# Patient Record
Sex: FEMALE | Race: AMERICAN INDIAN OR ALASKA NATIVE | NOT HISPANIC OR LATINO | URBAN - METROPOLITAN AREA
[De-identification: names, ages, dates, MRNs, and addresses within clinical notes are randomized per-mention and may not be internally consistent; named-entity substitution may affect disease eponyms.]

---

## 2021-06-21 ENCOUNTER — EMERGENCY (EMERGENCY)
Facility: HOSPITAL | Age: 26
LOS: 1 days | Discharge: HOME | End: 2021-06-21
Attending: EMERGENCY MEDICINE | Admitting: EMERGENCY MEDICINE
Payer: COMMERCIAL

## 2021-06-21 VITALS
HEIGHT: 64 IN | OXYGEN SATURATION: 99 % | WEIGHT: 149.91 LBS | RESPIRATION RATE: 16 BRPM | SYSTOLIC BLOOD PRESSURE: 107 MMHG | HEART RATE: 78 BPM | TEMPERATURE: 98 F | DIASTOLIC BLOOD PRESSURE: 75 MMHG
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
## 2021-06-21 DIAGNOSIS — T15.92XA FOREIGN BODY ON EXTERNAL EYE, PART UNSPECIFIED, LEFT EYE, INITIAL ENCOUNTER: ICD-10-CM
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
## 2021-06-21 PROCEDURE — 99283 EMERGENCY DEPT VISIT LOW MDM: CPT

## 2021-06-21 RX ORDER — MOXIFLOXACIN HCL 0.5 %
1 DROPS OPHTHALMIC (EYE) ONCE
Refills: 0 | Status: COMPLETED | OUTPATIENT
Start: 2021-06-21 | End: 2021-06-21

## 2021-06-21 RX ORDER — MOXIFLOXACIN HCL 0.5 %
1 DROPS OPHTHALMIC (EYE) ONCE
Refills: 0 | Status: DISCONTINUED | OUTPATIENT
Start: 2021-06-21 | End: 2021-06-21

## 2021-06-21 RX ADMIN — Medication 1 DROP(S): at 22:35

## 2021-06-21 RX ADMIN — Medication 1 DROP(S): at 20:52

## 2021-06-21 NOTE — ED PROVIDER NOTE - CLINICAL SUMMARY MEDICAL DECISION MAKING FREE TEXT BOX
under flourescein stain, no corneal abrasion or FB appreciated, scanned upper and lower eyelids with slit lamp, no ulcer appreciated. cornea otherwise normal. will refer to optho in am, started on abx eye drops, strict return precautions.

## 2021-06-21 NOTE — ED PROVIDER NOTE - CARE PROVIDER_API CALL
Stevie Radford  OPHTHALMOLOGY  [address] [address]  Phone: [phone]  Fax: [phone]  Follow Up Time:

## 2021-06-21 NOTE — ED PROVIDER NOTE - OBJECTIVE STATEMENT
27 yo F, no pmhx, presents with L eye fb sensation. notes she was attempting to readjust her L daily wear contact lens, and has been unable to remove it from the eye. has sensation that contact lens is stuck to L upper inner eyelid. denies worsening blurry vision, denies pain, denies discharge or tearing. denies facial numbness, paresthesias or swelling. notes no hx of eye surgery. wears daily contacts.

## 2021-06-21 NOTE — ED PROVIDER NOTE - PATIENT PORTAL LINK FT
You can access the FollowMyHealth Patient Portal offered by [facility] by registering at the following website: [email or web address]. By joining [facility] FollowMyHealth portal, you will also be able to view your health information using other applications (apps) compatible with our system.

## 2021-06-21 NOTE — ED ADULT NURSE NOTE - NSIMPLEMENTINTERV_GEN_ALL_ED
Implemented All Universal Safety Interventions:  [place] to call system. Call bell, personal items and telephone within reach. Instruct patient to call for assistance. Room bathroom lighting operational. Non-slip footwear when patient is off stretcher. Physically safe environment: no spills, clutter or unnecessary equipment. Stretcher in lowest position, wheels locked, appropriate side rails in place.

## 2021-06-21 NOTE — ED PROVIDER NOTE - NSFOLLOWUPINSTRUCTIONS_ED_ALL_ED_FT
What Type Of Note Output Would You Prefer (Optional)?: Standard Output
How Severe Is Your Skin Lesion?: mild
Has Your Skin Lesion Been Treated?: not been treated
Is This A New Presentation, Or A Follow-Up?: Skin Lesions
Eye Foreign Body    WHAT YOU NEED TO KNOW:    What is an eye foreign body (EFB)? An EFB is an object that gets stuck in your eye. Tiny pieces of metal, dust, wood, and sand are the most common foreign bodies.    Eye Anatomy         What are the signs and symptoms of an EFB?   •The feeling that something is in your eye      •Eye pain, redness, or watering      •Sensitivity to light      •Blurry vision or changes in your vision      How is an EFB diagnosed? Your healthcare provider will ask about your symptoms and examine your eye. The provider may check your vision by asking you to read letters or numbers off of a chart. You may need any of the following:  •A slit-lamp test uses a microscope to look into your eye and check for injury. A dye may be used to look for scratches or other damage to your eye.             •Ultrasound or CT pictures may show where the foreign body is located in your eye. It may also show damage to deeper parts of your eye. You may be given contrast liquid to help your eye show up better in pictures. Tell the healthcare provider if you have ever had an allergic reaction to contrast liquid.      How is an EFB treated? Medicines will be given to decrease pain or prevent an infection. Your healthcare provider may numb your eye and flush it with liquid to help remove the FB. The provider may also use a cotton swab or other tools to help remove the FB. If the FB is hard to remove or has damaged deeper parts of your eye, you will need surgery to remove it.    What can I do to help my eye heal? You may have pain, sensitivity to light, or blurry vision for a few days. Do the following to help your eye heal:  •Do not rub your eye. This may cause more damage or infection.      •Do not wear your contacts lenses until your eye heals. Ask your healthcare provider how long to follow this direction.      •Wear sunglasses as directed. Sunglasses help protect the eye and decrease sensitivity to light.      What can I do to prevent another EFB?   •Wear safety glasses, eye shields, or goggles. These items can prevent eye injury. Make sure the eyewear wraps around the sides of your face. Wear these items while you work with chemicals, metal, wood, or bodily fluids such as blood. Also wear protective eyewear during sports such as racquetball or swimming. Do not use regular eye glasses for eye protection. They will not protect your eyes from foreign bodies or chemicals.      •Use contact lenses as directed. Wash your hands before you clean, insert, or remove your contacts. Insert and remove contact lenses correctly. Clean and change your contacts as directed to help prevent eye damage or infection.  Handwashing           When should I seek immediate care?   •You suddenly lose your vision.      •You have severe eye pain.      When should I call my doctor?   •You have new or worse eye swelling.      •Your symptoms do not get better, even after the foreign body is removed.      •You have white or yellow fluid draining from your eye.      •You have questions or concerns about your condition or care.      CARE AGREEMENT:    You have the right to help plan your care. Learn about your health condition and how it may be treated. Discuss treatment options with your healthcare providers to decide what care you want to receive. You always have the right to refuse treatment.

## 2021-06-21 NOTE — ED ADULT NURSE NOTE - OBJECTIVE STATEMENT
26y female presents to ED c/o retained contact lens in left eye. Pt endorses tearing and pain. A&Ox4.

## 2023-07-14 NOTE — ED ADULT NURSE NOTE - NSFALLRSKHARMRISK_ED_ALL_ED
no Additional Notes: Chigger bites. Detail Level: Simple Render Risk Assessment In Note?: no Additional Notes: F/u prn.\\nLesion not currently inflamed.\\nPt reports of no pain or discomfort at this time. Additional Notes: LN2, PRN.